# Patient Record
Sex: MALE | Race: BLACK OR AFRICAN AMERICAN | NOT HISPANIC OR LATINO | Employment: FULL TIME | URBAN - METROPOLITAN AREA
[De-identification: names, ages, dates, MRNs, and addresses within clinical notes are randomized per-mention and may not be internally consistent; named-entity substitution may affect disease eponyms.]

---

## 2021-12-24 ENCOUNTER — HOSPITAL ENCOUNTER (EMERGENCY)
Facility: HOSPITAL | Age: 36
Discharge: HOME | End: 2021-12-24
Attending: EMERGENCY MEDICINE
Payer: OTHER GOVERNMENT

## 2021-12-24 VITALS
SYSTOLIC BLOOD PRESSURE: 139 MMHG | OXYGEN SATURATION: 97 % | WEIGHT: 240 LBS | TEMPERATURE: 99 F | DIASTOLIC BLOOD PRESSURE: 78 MMHG | BODY MASS INDEX: 30.8 KG/M2 | RESPIRATION RATE: 16 BRPM | HEART RATE: 78 BPM | HEIGHT: 74 IN

## 2021-12-24 DIAGNOSIS — J06.9 URI WITH COUGH AND CONGESTION: Primary | ICD-10-CM

## 2021-12-24 DIAGNOSIS — Z20.822 CLOSE EXPOSURE TO COVID-19 VIRUS: ICD-10-CM

## 2021-12-24 LAB
CTP QC/QA: YES
CTP QC/QA: YES
POC MOLECULAR INFLUENZA A AGN: NEGATIVE
POC MOLECULAR INFLUENZA B AGN: NEGATIVE
SARS-COV-2 RDRP RESP QL NAA+PROBE: NEGATIVE

## 2021-12-24 PROCEDURE — 99284 EMERGENCY DEPT VISIT MOD MDM: CPT

## 2021-12-24 PROCEDURE — U0002 COVID-19 LAB TEST NON-CDC: HCPCS | Performed by: EMERGENCY MEDICINE

## 2021-12-24 PROCEDURE — 87502 INFLUENZA DNA AMP PROBE: CPT

## 2021-12-24 RX ORDER — BENZONATATE 100 MG/1
100 CAPSULE ORAL 3 TIMES DAILY PRN
Qty: 20 CAPSULE | Refills: 0 | Status: SHIPPED | OUTPATIENT
Start: 2021-12-24 | End: 2022-01-03

## 2021-12-24 RX ORDER — MONTELUKAST SODIUM 10 MG/1
10 TABLET ORAL NIGHTLY
Qty: 30 TABLET | Refills: 0 | Status: SHIPPED | OUTPATIENT
Start: 2021-12-24 | End: 2022-01-23

## 2021-12-24 RX ORDER — PROMETHAZINE HYDROCHLORIDE AND DEXTROMETHORPHAN HYDROBROMIDE 6.25; 15 MG/5ML; MG/5ML
5 SYRUP ORAL NIGHTLY PRN
Qty: 118 ML | Refills: 0 | Status: SHIPPED | OUTPATIENT
Start: 2021-12-24 | End: 2022-01-03

## 2021-12-24 RX ORDER — LORATADINE 10 MG/1
10 TABLET ORAL EVERY MORNING
Qty: 60 TABLET | Refills: 0 | Status: SHIPPED | OUTPATIENT
Start: 2021-12-24 | End: 2022-12-24

## 2021-12-24 RX ORDER — FLUTICASONE PROPIONATE 50 MCG
2 SPRAY, SUSPENSION (ML) NASAL DAILY
Qty: 16 G | Refills: 0 | Status: SHIPPED | OUTPATIENT
Start: 2021-12-24

## 2021-12-24 NOTE — ED TRIAGE NOTES
Pt reports here is just here for a covid test, reports he is going out of town and has had a little cough and wants to make sure he does not have it before he leaves. Denies any fever, or SOB. Pt aaox4, resp even/nl, NAD noted.

## 2021-12-24 NOTE — ED PROVIDER NOTES
Encounter Date: 2021       History     Chief Complaint   Patient presents with    Cough     Pt reports cough and recent covid exposure.      36 y.o. male with hypertension presents to emergency department complaining of cough congestion that began last week reports in the floor [place] for symptoms.  Denies fever, vomiting, diarrhea.  Reports being exposed to COVID 19 positive individual for days ago.  Requesting COVID test prior to ordering 7:00 a.m. flight today.  Denies fever, shortness of breath or chest pain.        Review of patient's allergies indicates:  No Known Allergies  Past Medical History:   Diagnosis Date    Hypertension      Past Surgical History:   Procedure Laterality Date    COLON SURGERY       Family History   Problem Relation Age of Onset    Diabetes Father     Cancer Neg Hx     Heart disease Neg Hx      Social History     Tobacco Use    Smoking status: Former Smoker     Packs/day: 0.25     Years: 4.00     Pack years: 1.00     Quit date: 2013     Years since quittin.3   Substance Use Topics    Alcohol use: Yes     Alcohol/week: 3.7 standard drinks     Types: 2 Shots of liquor, 2 Standard drinks or equivalent per week     Comment: Social    Drug use: Yes     Types: Marijuana     Review of Systems   Constitutional: Negative for fever.   HENT: Positive for congestion and rhinorrhea. Negative for sore throat and trouble swallowing.    Respiratory: Positive for cough. Negative for shortness of breath.    Cardiovascular: Negative for chest pain.   Gastrointestinal: Negative for diarrhea and vomiting.   All other systems reviewed and are negative.      Physical Exam     Initial Vitals [21 0045]   BP Pulse Resp Temp SpO2   -- 88 16 98.8 °F (37.1 °C) 97 %      MAP       --         Physical Exam    Nursing note and vitals reviewed.  Constitutional: He appears well-developed and well-nourished. He is not diaphoretic. No distress.   HENT:   Head: Normocephalic and atraumatic.    Mouth/Throat: Oropharynx is clear and moist.   Eyes: Conjunctivae are normal.   Neck: Phonation normal. No stridor present.   Normal range of motion.  Cardiovascular: Regular rhythm and intact distal pulses.   Pulmonary/Chest: Effort normal. No accessory muscle usage or stridor. No tachypnea. No respiratory distress.   Abdominal: He exhibits no distension. There is no abdominal tenderness.   Musculoskeletal:         General: No tenderness. Normal range of motion.      Cervical back: Normal range of motion.     Neurological: He is alert and oriented to person, place, and time. He has normal strength. Gait normal. GCS eye subscore is 4. GCS verbal subscore is 5. GCS motor subscore is 6.   Skin: Skin is warm and intact.   Psychiatric: He has a normal mood and affect.         ED Course   Procedures  Labs Reviewed   SARS-COV-2 RDRP GENE   POCT INFLUENZA A/B MOLECULAR          Imaging Results    None          Medications - No data to display                       Clinical Impression:   Final diagnoses:  [J06.9] URI with cough and congestion (Primary)  [Z20.822] Close exposure to COVID-19 virus          ED Disposition Condition    Discharge Stable        ED Prescriptions     Medication Sig Dispense Start Date End Date Auth. Provider    fluticasone propionate (FLONASE) 50 mcg/actuation nasal spray 2 sprays (100 mcg total) by Each Nostril route once daily. 16 g 12/24/2021  Beti Rice MD    montelukast (SINGULAIR) 10 mg tablet Take 1 tablet (10 mg total) by mouth every evening. 30 tablet 12/24/2021 1/23/2022 Beti Rice MD    promethazine-dextromethorphan (PROMETHAZINE-DM) 6.25-15 mg/5 mL Syrp Take 5 mLs by mouth nightly as needed (cough). 118 mL 12/24/2021 1/3/2022 Beti Rice MD    benzonatate (TESSALON) 100 MG capsule Take 1 capsule (100 mg total) by mouth 3 (three) times daily as needed for Cough. 20 capsule 12/24/2021 1/3/2022 Beti Rice MD    loratadine (CLARITIN) 10 mg tablet Take 1 tablet (10 mg total)  by mouth every morning. 60 tablet 12/24/2021 12/24/2022 Beti Rice MD        Follow-up Information     Follow up With Specialties Details Why Contact Info    Your PCP  Call today to schedule an appointment, for re-evaluation of today's complaint, and ongoing care     [facility] Emergency Dept Emergency Medicine Go to  As needed, If symptoms worsen [address] [place] [id number] [phone]           Beti Rice MD  12/24/21 1554

## 2021-12-24 NOTE — Clinical Note
"Christian "Bart Galicia was seen and treated in our emergency department on 12/24/2021.     COVID-19 is present in our communities across the state. There is limited testing for COVID at this time, so not all patients can be tested. In this situation, your employee meets the following criteria:    Christian Galicia has met the criteria for COVID-19 testing and has a NEGATIVE result. The employee can return to work once they are asymptomatic for 72 hours without the use of fever reducing medications (Tylenol, Motrin, etc).     If you have any questions or concerns, or if I can be of further assistance, please do not hesitate to contact me.    Sincerely,             Beti Rice MD"

## 2022-06-01 ENCOUNTER — OFFICE VISIT (OUTPATIENT)
Dept: OPTOMETRY | Facility: CLINIC | Age: 37
End: 2022-06-01
Payer: COMMERCIAL

## 2022-06-01 ENCOUNTER — TELEPHONE (OUTPATIENT)
Dept: OPHTHALMOLOGY | Facility: CLINIC | Age: 37
End: 2022-06-01
Payer: COMMERCIAL

## 2022-06-01 DIAGNOSIS — H16.123 FILAMENTARY KERATITIS OF BOTH EYES: Primary | ICD-10-CM

## 2022-06-01 PROCEDURE — 92002 INTRM OPH EXAM NEW PATIENT: CPT | Mod: S$GLB,,,

## 2022-06-01 PROCEDURE — 99999 PR PBB SHADOW E&M-EST. PATIENT-LVL II: ICD-10-PCS | Mod: PBBFAC,,,

## 2022-06-01 PROCEDURE — 99999 PR PBB SHADOW E&M-EST. PATIENT-LVL II: CPT | Mod: PBBFAC,,,

## 2022-06-01 PROCEDURE — 92002 PR EYE EXAM, NEW PATIENT,INTERMED: ICD-10-PCS | Mod: S$GLB,,,

## 2022-06-01 NOTE — TELEPHONE ENCOUNTER
----- Message from Jade Rivas MA sent at 6/1/2022  9:26 AM CDT -----  Contact: DEEP (wife) [phone]    ----- Message -----  From: Diamante Garcia  Sent: 6/1/2022   9:25 AM CDT  To: [facility] Clinical Support Staff    Patient's wife DEEP called stating that they dx him at [facility] a week ago with pink eye that could of been there for a little while. She stated that they prescribed him some drops. She stated that the eye is bulging, sos she brought him to the ER on Monday and they prescribed him a different drop stating that maybe it's a side effect from the first rx he was given. She stated that the eye isn't better and it's itching and burning and the other eye is now having issues as well. She feels the need for the pt to be seen today.     Please contact patient's wife DEEP to schedule at [phone]

## 2022-06-01 NOTE — PROGRESS NOTES
HPI     Patient presents for red and irritated eyes. Onset past week, on and off.   Last week symptoms got worse. Went to [facility] for eye exam and was   prescribed tobradex OU TID. No improvement with drops. Day before   yesterday OD became swollen after putting drops in. Went to ED and was   prescribed Gentamycin QID OD. Patient was unable to get drops until   yesterday.  Patient wakes up to crusted lids since taking tobradex. Constant watering.   Itchy eyes (FBS behind eyes), pain when rubbing eyes. Air bothers eyes a   lot. No burning sensation. No relief with any of the drops. Tried visine   BID, clear eyes for redness BID and refresh only used twice. No changes to   vision. Blur only present when tearing.     Last edited by Artie Carter, OD on 6/1/2022  3:43 PM. (History)            Assessment /Plan     For exam results, see Encounter Report.    Filamentary keratitis of both eyes  -OS>OD  -Start Refresh celluvisc or liquigel Q1h OU  -Patient educated on blurring of vision due to thickness of drops  -Discontinue tobradex and gentamycin  -consider BCL if pain worsens  -RTC for f/u with Dr. Shaw next week

## 2022-06-02 ENCOUNTER — TELEPHONE (OUTPATIENT)
Dept: OPHTHALMOLOGY | Facility: CLINIC | Age: 37
End: 2022-06-02
Payer: COMMERCIAL

## 2022-06-02 NOTE — TELEPHONE ENCOUNTER
----- Message from Juan Jose Condon MA sent at 6/2/2022  4:47 PM CDT -----  Contact: [phone]  Oliver     This is a  patient and the patient feel he need to been seen on tomorrow patient stating feel worse   ----- Message -----  From: Diamante Garcia  Sent: 6/2/2022   4:28 PM CDT  To: [facility] Staff    Patient's wife DEEP is calling stating that the patient's eyes are burning, itching and sensitive to light and is wondering if this is normal. She's also trying to see if he needs to be seen sooner.     Please contact pt to discuss at [phone]

## 2022-06-02 NOTE — TELEPHONE ENCOUNTER
Spoke to patient sent message to Martha      ----- Message from Diamante Garcia sent at 6/2/2022  4:27 PM CDT -----  Contact: [phone]  Patient's wife DEEP is calling stating that the patient's eyes are burning, itching and sensitive to light and is wondering if this is normal. She's also trying to see if he needs to be seen sooner.     Please contact pt to discuss at [phone]

## 2022-06-02 NOTE — TELEPHONE ENCOUNTER
Called and scheduled patient an appt  ----- Message from Artie Carter OD sent at 6/2/2022  1:00 PM CDT -----  This is the urgent care patient that Dr. Shaw would like to see next week. Thanks!

## 2022-06-03 ENCOUNTER — OFFICE VISIT (OUTPATIENT)
Dept: OPHTHALMOLOGY | Facility: CLINIC | Age: 37
End: 2022-06-03
Payer: COMMERCIAL

## 2022-06-03 DIAGNOSIS — B30.9 VIRAL CONJUNCTIVITIS OF BOTH EYES: Primary | ICD-10-CM

## 2022-06-03 PROCEDURE — 68115 PR EXCIS CONJUNC LESN,>1 CM: ICD-10-PCS | Mod: 50,S$GLB,, | Performed by: OPHTHALMOLOGY

## 2022-06-03 PROCEDURE — 99999 PR PBB SHADOW E&M-EST. PATIENT-LVL II: ICD-10-PCS | Mod: PBBFAC,,, | Performed by: OPHTHALMOLOGY

## 2022-06-03 PROCEDURE — 99204 OFFICE O/P NEW MOD 45 MIN: CPT | Mod: 25,S$GLB,, | Performed by: OPHTHALMOLOGY

## 2022-06-03 PROCEDURE — 68115 EXC LES CONJUNCTIVA >1 CM: CPT | Mod: 50,S$GLB,, | Performed by: OPHTHALMOLOGY

## 2022-06-03 PROCEDURE — 99204 PR OFFICE/OUTPT VISIT, NEW, LEVL IV, 45-59 MIN: ICD-10-PCS | Mod: 25,S$GLB,, | Performed by: OPHTHALMOLOGY

## 2022-06-03 PROCEDURE — 99999 PR PBB SHADOW E&M-EST. PATIENT-LVL II: CPT | Mod: PBBFAC,,, | Performed by: OPHTHALMOLOGY

## 2022-06-03 RX ORDER — PREDNISOLONE ACETATE 10 MG/ML
1 SUSPENSION/ DROPS OPHTHALMIC 4 TIMES DAILY
Qty: 5 ML | Refills: 3 | Status: SHIPPED | OUTPATIENT
Start: 2022-06-03 | End: 2022-07-03

## 2022-06-03 NOTE — PROGRESS NOTES
HPI     Dr Carter / [facility]    Membranous conjunctivitis OS>OD    Tobrex gtts then gentamycin now refresh    37 y/o male presents to clinic for eye pain. Pt states yesterday was in a   lot of pain. Very light sensitive. Using refresh liquid gel q1h OU. Not as   bad today but still extremely sensitive.     Refresh liquid gel q1h OU    Last edited by Erika Ewing MD on 6/3/2022 12:11 PM. (History)            Assessment /Plan     For exam results, see Encounter Report.    Viral conjunctivitis of both eyes    Other orders  -     prednisoLONE acetate (PRED FORTE) 1 % DrpS; Place 1 drop into both eyes 4 (four) times daily.  Dispense: 5 mL; Refill: 3          Membranous conjunctivitis OS>OD with central epi defect OS  - will strip membranes today  - start PF QID OU    Pt scheduled to see Dr. COLE next wk -- if recurrent membranes, would recommend Dr. Jean Baptiste remove them at that time.  Otherwise if doing well, okay to slowly taper PF 3/2/1/off by week    Diagnosis: Membraneous conjunctivitis both eyes    Procedure:  Membrane stripping RUL,  NATHAN, LLL    Anesthesia: 4% lidocaine    EBL <1cc    C/O: none    Procedure in detail:  After the eyelids were anesthetized a sterile jewelers forceps and weck cells were used to strip and remove the membranes adherent to the tarsal conjunctiva and fornices.   The patient tolerated the procedure well.

## 2022-06-08 ENCOUNTER — OFFICE VISIT (OUTPATIENT)
Dept: OPTOMETRY | Facility: CLINIC | Age: 37
End: 2022-06-08
Payer: COMMERCIAL

## 2022-06-08 ENCOUNTER — OFFICE VISIT (OUTPATIENT)
Dept: OPHTHALMOLOGY | Facility: CLINIC | Age: 37
End: 2022-06-08
Payer: COMMERCIAL

## 2022-06-08 DIAGNOSIS — H16.123 FILAMENTARY KERATITIS OF BOTH EYES: ICD-10-CM

## 2022-06-08 DIAGNOSIS — B30.9 VIRAL CONJUNCTIVITIS OF BOTH EYES: Primary | ICD-10-CM

## 2022-06-08 DIAGNOSIS — H10.89 MEMBRANOUS CONJUNCTIVITIS: ICD-10-CM

## 2022-06-08 PROCEDURE — 99024 PR POST-OP FOLLOW-UP VISIT: ICD-10-PCS | Mod: S$GLB,,, | Performed by: OPHTHALMOLOGY

## 2022-06-08 PROCEDURE — 99999 PR PBB SHADOW E&M-EST. PATIENT-LVL II: ICD-10-PCS | Mod: PBBFAC,,, | Performed by: OPHTHALMOLOGY

## 2022-06-08 PROCEDURE — 99999 PR PBB SHADOW E&M-EST. PATIENT-LVL III: CPT | Mod: PBBFAC,,, | Performed by: OPTOMETRIST

## 2022-06-08 PROCEDURE — 99499 UNLISTED E&M SERVICE: CPT | Mod: S$GLB,,, | Performed by: OPTOMETRIST

## 2022-06-08 PROCEDURE — 99999 PR PBB SHADOW E&M-EST. PATIENT-LVL II: CPT | Mod: PBBFAC,,, | Performed by: OPHTHALMOLOGY

## 2022-06-08 PROCEDURE — 99024 POSTOP FOLLOW-UP VISIT: CPT | Mod: S$GLB,,, | Performed by: OPHTHALMOLOGY

## 2022-06-08 PROCEDURE — 99499 NO LOS: ICD-10-PCS | Mod: S$GLB,,, | Performed by: OPTOMETRIST

## 2022-06-08 PROCEDURE — 99999 PR PBB SHADOW E&M-EST. PATIENT-LVL III: ICD-10-PCS | Mod: PBBFAC,,, | Performed by: OPTOMETRIST

## 2022-06-08 NOTE — PROGRESS NOTES
HPI     37 y/o male presents to clinic for viral conjunctivitis per Dr Ewing        Last edited by Yolie Win on 6/8/2022 10:32 AM. (History)            Assessment /Plan     For exam results, see Encounter Report.    Viral conjunctivitis of both eyes      Improved, few membranes removed, Filaments OS deibridedn  COnt PF qid for 1-2 week

## 2022-06-09 NOTE — PROGRESS NOTES
HPI     Eye Problem      Additional comments: Filamentary Keratitis of both eye                Comments     Patient's age: 36 y.o.  Occupation: [employer]   Approximate date of last eye examination:  06/03/2022  Name of last eye doctor seen:    City/State: [facility]   Wears glasses? no     If yes, wears  Full-time or part-time?    Present glasses are: Bifocal, SV Distance, SV Reading?    Approximate age of present glasses:     Got new glasses following last exam, or subsequently?:     Any problem with VA with glasses?   Wears CLs?:  no  Headaches?  no  Eye pain/discomfort?  Itchiness OU                                                                                     Flashes?  no  Floaters?  no  Diplopia/Double vision?  no  Patient's Ocular History:         Any eye surgeries? no         Any eye injury?  no         Any treatment for eye disease?  no  Family history of eye disease?  no  Significant patient medical history:         1. Diabetes?  no       If yes, IDDM or NIDDM?    2. HBP?  no              3. Other (describe):     ! OTC eyedrops currently using:  refresh gel   ! Prescription eye meds currently using:  prednislone    ! Any history of allergy/adverse reaction to any eye meds used   previously?  no    ! Any history of allergy/adverse reaction to eyedrops used during prior   eye exam(s)? no    ! Any history of allergy/adverse reaction to Novacaine or similar meds?   no   ! Any history of allergy/adverse reaction to Epinephrine or similar meds?   no    ! Patient okay with use of anesthetic eyedrops to check eye pressure?    Yes          ! Patient okay with use of eyedrops to dilate pupils today?  yes   !  Allergies/Medications/Medical History/Family History reviewed today?    yes      PD =     Desired reading distance =    Approx computer distance =                                                                        Last edited by Juan Jose Condon MA on 6/8/2022 10:15 AM. (History)             Assessment /Plan     For exam results, see Encounter Report.    1. Viral conjunctivitis of both eyes     2. Membranous conjunctivitis     3. Filamentary keratitis of both eyes                    Bilateral membranous viral conjunctiviits, with associated filamentary keratitis in both eyes.  Mr. Galicia was seen by Dr. Ewing a few days ago who removed membrane from tarsal conjuncitiva bilateally and prescribed Prednisolone Acetate 1.0% ophthalmic suspension for use in both eye four times per day.    Mr. Galicia in today for follow-up check, with recurrence of membrane formation and persistent mild filamentary keratitis.  Per Dr. Ewing's notes, refer Mr. Galicia to Dr. Jean Baptiste for evaluation and removal of membranes from tarsal conjunctiva bilaterally.    Follow up as recommended by Dr. Jean Baptiste.

## 2022-06-09 NOTE — PATIENT INSTRUCTIONS
Bilateral membranous viral conjunctiviits, with associated filamentary keratitis in both eyes.  Mr. Galicia was seen by Dr. Ewing a few days ago who removed membrane from tarsal conjuncitiva bilateally and prescribed Prednisolone Acetate 1.0% ophthalmic suspension for use in both eye four times per day.    Mr. Galicia in today for follow-up check, with recurrence of membrane formation and persistent mild filamentary keratitis.  Per Dr. Ewing's notes, refer Mr. Galicia to Dr. Jean Baptiste for evaluation and removal of membranes from tarsal conjunctiva bilaterally.    Follow up as recommended by Dr. Jean Baptiste.